# Patient Record
Sex: FEMALE | Race: WHITE | NOT HISPANIC OR LATINO | Employment: FULL TIME | URBAN - METROPOLITAN AREA
[De-identification: names, ages, dates, MRNs, and addresses within clinical notes are randomized per-mention and may not be internally consistent; named-entity substitution may affect disease eponyms.]

---

## 2019-09-03 ENCOUNTER — OFFICE VISIT (OUTPATIENT)
Dept: FAMILY MEDICINE CLINIC | Facility: CLINIC | Age: 24
End: 2019-09-03

## 2019-09-03 VITALS
HEART RATE: 80 BPM | DIASTOLIC BLOOD PRESSURE: 69 MMHG | BODY MASS INDEX: 23.01 KG/M2 | OXYGEN SATURATION: 98 % | TEMPERATURE: 98.3 F | SYSTOLIC BLOOD PRESSURE: 104 MMHG | WEIGHT: 143.2 LBS | HEIGHT: 66 IN

## 2019-09-03 DIAGNOSIS — F41.1 GAD (GENERALIZED ANXIETY DISORDER): Primary | ICD-10-CM

## 2019-09-03 DIAGNOSIS — Z30.41 ENCOUNTER FOR SURVEILLANCE OF CONTRACEPTIVE PILLS: ICD-10-CM

## 2019-09-03 PROCEDURE — 99213 OFFICE O/P EST LOW 20 MIN: CPT | Performed by: FAMILY MEDICINE

## 2019-09-03 RX ORDER — NORETHINDRONE ACETATE AND ETHINYL ESTRADIOL, AND FERROUS FUMARATE 1MG-20(24)
1 KIT ORAL DAILY
COMMUNITY
Start: 2019-08-16 | End: 2021-12-29

## 2019-09-03 RX ORDER — ESCITALOPRAM OXALATE 10 MG/1
10 TABLET ORAL DAILY
Qty: 90 TABLET | Refills: 2 | Status: SHIPPED | OUTPATIENT
Start: 2019-09-03 | End: 2020-08-31

## 2019-09-03 RX ORDER — ESCITALOPRAM OXALATE 10 MG/1
TABLET ORAL
COMMUNITY
Start: 2019-07-26 | End: 2019-09-03 | Stop reason: SDUPTHER

## 2019-09-03 NOTE — PROGRESS NOTES
Chief Complaint   Patient presents with   • Anxiety       Subjective   Sultana Hampton is a 23 y.o. female.     History of Present Illness     F/u BARBARA.   Doing well with lexapro.  No SE.  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    F/U contraception.  Doing well with birth control pill.   2 years in July now.         The following portions of the patient's history were reviewed and updated as appropriate: allergies, current medications, past family history, past medical history, past social history, past surgical history and problem list.    Review of Systems   Constitutional: Negative for appetite change and fatigue.   HENT: Negative for nosebleeds and sore throat.    Eyes: Negative for blurred vision and visual disturbance.   Respiratory: Negative for shortness of breath and wheezing.    Cardiovascular: Negative for chest pain and leg swelling.   Gastrointestinal: Negative for abdominal distention and abdominal pain.   Endocrine: Negative for cold intolerance and polyuria.   Genitourinary: Negative for dysuria and hematuria.   Musculoskeletal: Negative for arthralgias and myalgias.   Skin: Negative for color change and rash.   Neurological: Negative for weakness and confusion.   Psychiatric/Behavioral: Negative for agitation and depressed mood.       Patient Active Problem List   Diagnosis   • BARBARA (generalized anxiety disorder)   • Encounter for surveillance of contraceptive pills       Allergies   Allergen Reactions   • Ibuprofen Swelling   • Amoxicillin Other (See Comments)         Current Outpatient Medications:   •  escitalopram (LEXAPRO) 10 MG tablet, Take 1 tablet by mouth Daily., Disp: 90 tablet, Rfl: 2  •  Norethin Ace-Eth Estrad-FE 1-20 MG-MCG(24) capsule, Take 1 tablet by mouth Daily., Disp: , Rfl:     Past Medical History:   Diagnosis Date   • Anxiety        History reviewed. No pertinent surgical history.    History  reviewed. No pertinent family history.    Social History     Tobacco Use   • Smoking status: Never Smoker   • Smokeless tobacco: Never Used   Substance Use Topics   • Alcohol use: No     Frequency: Never            Objective     Vitals:    09/03/19 1346   BP: 104/69   Pulse: 80   Temp: 98.3 °F (36.8 °C)   SpO2: 98%     Body mass index is 23.11 kg/m².    Physical Exam   Constitutional: She appears well-developed and well-nourished.   HENT:   Head: Normocephalic and atraumatic.   Mouth/Throat: Oropharynx is clear and moist.   Eyes: Pupils are equal, round, and reactive to light. No scleral icterus.   Neck: No thyromegaly present.   Cardiovascular: Normal rate and regular rhythm. Exam reveals no gallop and no friction rub.   No murmur heard.  Pulmonary/Chest: Effort normal. No respiratory distress. She has no wheezes. She has no rales. She exhibits no tenderness.   Abdominal: Soft. Bowel sounds are normal. She exhibits no distension. There is no tenderness.   Musculoskeletal: Normal range of motion. She exhibits no edema or deformity.   Lymphadenopathy:     She has no cervical adenopathy.   Neurological: No cranial nerve deficit. She exhibits normal muscle tone.   Skin: Skin is warm and dry. No rash noted. She is not diaphoretic.   Vitals reviewed.      No results found for: GLUCOSE, BUN, CREATININE, EGFRIFNONA, EGFRIFAFRI, BCR, K, CO2, CALCIUM, PROTENTOTREF, ALBUMIN, LABIL2, AST, ALT    No results found for: WBC, RBC, HGB, HCT, MCV, MCH, MCHC, RDW, RDWSD, MPV, PLT, NEUTRORELPCT, LYMPHORELPCT, MONORELPCT, EOSRELPCT, BASORELPCT, AUTOIGPER, NEUTROABS, LYMPHSABS, MONOSABS, EOSABS, BASOSABS, AUTOIGNUM, NRBC    Lab Results   Component Value Date    HGBA1C 4.9 04/02/2018       No results found for: [id number]    No results found for: TSH    No results found for: CHOL  Lab Results   Component Value Date    TRIG 55 04/02/2018     Lab Results   Component Value Date    HDL 40 (L) 04/02/2018     Lab Results   Component Value Date     LDL 91 04/02/2018     Lab Results   Component Value Date    VLDL 11 04/02/2018     No results found for: LDLHDL      Procedures    Assessment/Plan   Problems Addressed this Visit        Other    BARBARA (generalized anxiety disorder) - Primary    Relevant Medications    escitalopram (LEXAPRO) 10 MG tablet    Encounter for surveillance of contraceptive pills          No orders of the defined types were placed in this encounter.      Current Outpatient Medications   Medication Sig Dispense Refill   • escitalopram (LEXAPRO) 10 MG tablet Take 1 tablet by mouth Daily. 90 tablet 2   • Norethin Ace-Eth Estrad-FE 1-20 MG-MCG(24) capsule Take 1 tablet by mouth Daily.       No current facility-administered medications for this visit.        Sultana Berta had no medications administered during this visit.    Return in about 6 months (around 3/3/2020).    There are no Patient Instructions on file for this visit.

## 2020-08-31 RX ORDER — ESCITALOPRAM OXALATE 10 MG/1
TABLET ORAL
Qty: 90 TABLET | Refills: 2 | Status: SHIPPED | OUTPATIENT
Start: 2020-08-31 | End: 2020-09-09 | Stop reason: SDUPTHER

## 2020-09-09 ENCOUNTER — OFFICE VISIT (OUTPATIENT)
Dept: FAMILY MEDICINE CLINIC | Facility: CLINIC | Age: 25
End: 2020-09-09

## 2020-09-09 VITALS
WEIGHT: 148 LBS | HEART RATE: 70 BPM | RESPIRATION RATE: 20 BRPM | DIASTOLIC BLOOD PRESSURE: 60 MMHG | BODY MASS INDEX: 23.78 KG/M2 | TEMPERATURE: 98.2 F | SYSTOLIC BLOOD PRESSURE: 110 MMHG | OXYGEN SATURATION: 97 % | HEIGHT: 66 IN

## 2020-09-09 DIAGNOSIS — Z00.00 ENCOUNTER FOR ANNUAL PHYSICAL EXAM: Primary | ICD-10-CM

## 2020-09-09 DIAGNOSIS — F41.1 GAD (GENERALIZED ANXIETY DISORDER): ICD-10-CM

## 2020-09-09 PROCEDURE — 90471 IMMUNIZATION ADMIN: CPT | Performed by: FAMILY MEDICINE

## 2020-09-09 PROCEDURE — 99395 PREV VISIT EST AGE 18-39: CPT | Performed by: FAMILY MEDICINE

## 2020-09-09 PROCEDURE — 90686 IIV4 VACC NO PRSV 0.5 ML IM: CPT | Performed by: FAMILY MEDICINE

## 2020-09-09 RX ORDER — ESCITALOPRAM OXALATE 10 MG/1
10 TABLET ORAL DAILY
Qty: 90 TABLET | Refills: 2 | Status: SHIPPED | OUTPATIENT
Start: 2020-09-09 | End: 2021-03-04

## 2020-09-09 NOTE — PROGRESS NOTES
"  Patient here for annual physical exam    Subjective   Sultana SCHULZ is a 24 y.o. female.     History of Present Illness   24 year old WF here for annual exam.    The following portions of the patient's history were reviewed and updated as appropriate: allergies, current medications, past family history, past medical history, past social history, past surgical history and problem list  I am exercising regularly 3-5 times a week.    Last colonoscopy:NA.  Optometry:NOt utd.    Last PSA(if applicable):NA  Last mammo(if applicable):NA    F/U BARBARA.  Doing well with LExapro.  \"I like it a lot\".  NO SI or HI.    LDL 91 2 years ago.  TSH normal.      Immunization History   Administered Date(s) Administered   • Flulaval/Fluarix Quad 11/18/2014       Review of Systems   Constitutional: Negative for appetite change and fatigue.   HENT: Negative for nosebleeds and sore throat.    Eyes: Negative for blurred vision and visual disturbance.   Respiratory: Negative for shortness of breath and wheezing.    Cardiovascular: Negative for chest pain and leg swelling.   Gastrointestinal: Negative for abdominal distention and abdominal pain.   Endocrine: Negative for cold intolerance and polyuria.   Genitourinary: Negative for dysuria and hematuria.   Musculoskeletal: Negative for arthralgias and myalgias.   Skin: Negative for color change and rash.   Neurological: Negative for weakness and confusion.   Psychiatric/Behavioral: Negative for agitation and depressed mood.       Patient Active Problem List   Diagnosis   • BARBARA (generalized anxiety disorder)   • Encounter for surveillance of contraceptive pills   • Encounter for annual physical exam       Allergies   Allergen Reactions   • Ibuprofen Swelling   • Amoxicillin Other (See Comments)         Current Outpatient Medications:   •  escitalopram (LEXAPRO) 10 MG tablet, Take 1 tablet by mouth Daily., Disp: 90 tablet, Rfl: 2  •  Norethin Ace-Eth Estrad-FE 1-20 MG-MCG(24) capsule, Take 1 " tablet by mouth Daily., Disp: , Rfl:     Past Medical History:   Diagnosis Date   • Anxiety        History reviewed. No pertinent surgical history.    Family History   Problem Relation Age of Onset   • No Known Problems Mother    • No Known Problems Father        Social History     Tobacco Use   • Smoking status: Never Smoker   • Smokeless tobacco: Never Used   Substance Use Topics   • Alcohol use: No     Frequency: Never            Objective     Vitals:    09/09/20 1504   BP: 110/60   Pulse: 70   Resp: 20   Temp: 98.2 °F (36.8 °C)   SpO2: 97%     Body mass index is 23.89 kg/m².    Physical Exam   Constitutional: She appears well-developed and well-nourished.   HENT:   Head: Normocephalic and atraumatic.   Mouth/Throat: Oropharynx is clear and moist.   Eyes: Pupils are equal, round, and reactive to light. No scleral icterus.   Neck: No thyromegaly present.   Cardiovascular: Normal rate and regular rhythm. Exam reveals no gallop and no friction rub.   No murmur heard.  Pulmonary/Chest: Effort normal. No respiratory distress. She has no wheezes. She has no rales. She exhibits no tenderness.   Abdominal: Soft. Bowel sounds are normal. She exhibits no distension. There is no tenderness.   Musculoskeletal: Normal range of motion. She exhibits no edema or deformity.   Lymphadenopathy:     She has no cervical adenopathy.   Neurological: No cranial nerve deficit. She exhibits normal muscle tone.   Skin: Skin is warm and dry. No rash noted. She is not diaphoretic.   Vitals reviewed.      No results found for: GLUCOSE, BUN, CREATININE, EGFRIFNONA, EGFRIFAFRI, BCR, K, CO2, CALCIUM, PROTENTOTREF, ALBUMIN, LABIL2, BILIRUBIN, AST, ALT    No results found for: WBC, RBC, HGB, HCT, MCV, MCH, MCHC, RDW, RDWSD, MPV, PLT, NEUTRORELPCT, LYMPHORELPCT, MONORELPCT, EOSRELPCT, BASORELPCT, AUTOIGPER, NEUTROABS, LYMPHSABS, MONOSABS, EOSABS, BASOSABS, AUTOIGNUM, NRBC    Lab Results   Component Value Date    HGBA1C 4.9 04/02/2018       No  results found for: [id number]    No results found for: TSH    No results found for: CHOL  Lab Results   Component Value Date    TRIG 55 04/02/2018     Lab Results   Component Value Date    HDL 40 (L) 04/02/2018     Lab Results   Component Value Date    LDL 91 04/02/2018     Lab Results   Component Value Date    VLDL 11 04/02/2018     No results found for: LDLHDL      Procedures    Assessment/Plan   Problems Addressed this Visit        Other    BARBARA (generalized anxiety disorder)    Relevant Medications    escitalopram (LEXAPRO) 10 MG tablet    Encounter for annual physical exam - Primary        Preventive Care:  Recommend to see optometry.  Continue regular exercise.    Orders Placed This Encounter   Procedures   • Fluarix/Fluzone/Afluria Quad>6 Months       Current Outpatient Medications   Medication Sig Dispense Refill   • escitalopram (LEXAPRO) 10 MG tablet Take 1 tablet by mouth Daily. 90 tablet 2   • Norethin Ace-Eth Estrad-FE 1-20 MG-MCG(24) capsule Take 1 tablet by mouth Daily.       No current facility-administered medications for this visit.        Return in about 6 months (around 3/9/2021).    There are no Patient Instructions on file for this visit.

## 2021-01-04 ENCOUNTER — TELEPHONE (OUTPATIENT)
Dept: FAMILY MEDICINE CLINIC | Facility: CLINIC | Age: 26
End: 2021-01-04

## 2021-01-04 NOTE — TELEPHONE ENCOUNTER
Caller: Sultana SCHULZ    Relationship: Self    Best call back number:[phone]    What medications are you currently taking:   Current Outpatient Medications on File Prior to Visit   Medication Sig Dispense Refill   • escitalopram (LEXAPRO) 10 MG tablet Take 1 tablet by mouth Daily. 90 tablet 2   • Norethin Ace-Eth Estrad-FE 1-20 MG-MCG(24) capsule Take 1 tablet by mouth Daily.       No current facility-administered medications on file prior to visit.             Which medication are you concerned about: LEXAPRO 10 MG     What are your concerns: PATIENT AND  ARE GOING TO START TRYING TO HAVE A BABY. SHE WANTS ADVISE ON WHAT TO DO WITH MEDICATION. CAN SHE BE ON IT? DOES SHE NEED TO CUT BACK?  How long have you been taking these medications: 1 YEAR

## 2021-01-05 NOTE — TELEPHONE ENCOUNTER
oK FOR HUB TO RELAY MESSAGE:      Decrease lexapro to 1/2 tablet a day for 2 weeks then off.  Stop birth control after one month.

## 2021-01-25 ENCOUNTER — TELEPHONE (OUTPATIENT)
Dept: FAMILY MEDICINE CLINIC | Facility: CLINIC | Age: 26
End: 2021-01-25

## 2021-01-25 NOTE — TELEPHONE ENCOUNTER
Patient states she has taken pregnancy test and is negative. Gave patient information regarding Lexapro, patient verbalized understanding.

## 2021-01-25 NOTE — TELEPHONE ENCOUNTER
THE PATIENT HAS GONE OFF HER LEXIPRO. SHE HAS BEEN HAVING DIZZINESS AND HAS BEEN NAUSEOUS. SHE WANTS TO KNOW IF THAT IS A TYPICAL SYMPTOM.    CALLBACK NUMBER: [phone]

## 2021-01-25 NOTE — TELEPHONE ENCOUNTER
Tell her that could be from coming off the lexapro, but that is also symptoms of being pregnant.  Tell her to take a pregnancy test to see the results of this. Thanks.

## 2021-03-04 ENCOUNTER — OFFICE VISIT (OUTPATIENT)
Dept: FAMILY MEDICINE CLINIC | Facility: CLINIC | Age: 26
End: 2021-03-04

## 2021-03-04 VITALS
HEIGHT: 66 IN | HEART RATE: 72 BPM | SYSTOLIC BLOOD PRESSURE: 110 MMHG | OXYGEN SATURATION: 100 % | TEMPERATURE: 97.5 F | WEIGHT: 140 LBS | BODY MASS INDEX: 22.5 KG/M2 | DIASTOLIC BLOOD PRESSURE: 76 MMHG

## 2021-03-04 DIAGNOSIS — Z31.69 ENCOUNTER FOR PRECONCEPTION CONSULTATION: ICD-10-CM

## 2021-03-04 DIAGNOSIS — F41.1 GAD (GENERALIZED ANXIETY DISORDER): Primary | ICD-10-CM

## 2021-03-04 PROCEDURE — 99213 OFFICE O/P EST LOW 20 MIN: CPT | Performed by: FAMILY MEDICINE

## 2021-03-04 NOTE — PROGRESS NOTES
Chief Complaint   Patient presents with   • Anxiety       Subjective   Sultana SCHULZ is a 25 y.o. female.     History of Present Illness   F/U BARBARA.   Took self off lexapro and had some withdrawal.  Still with anxiety but dealing with it.    Thinking about having a baby.  On PNV.    The following portions of the patient's history were reviewed and updated as appropriate: allergies, current medications, past family history, past medical history, past social history, past surgical history and problem list.    Review of Systems   Constitutional: Negative for appetite change and fatigue.   HENT: Negative for nosebleeds and sore throat.    Eyes: Negative for blurred vision and visual disturbance.   Respiratory: Negative for shortness of breath and wheezing.    Cardiovascular: Negative for chest pain and leg swelling.   Gastrointestinal: Negative for abdominal distention and abdominal pain.   Endocrine: Negative for cold intolerance and polyuria.   Genitourinary: Negative for dysuria and hematuria.   Musculoskeletal: Negative for arthralgias and myalgias.   Skin: Negative for color change and rash.   Neurological: Negative for weakness and confusion.   Psychiatric/Behavioral: Negative for agitation and depressed mood.       Patient Active Problem List   Diagnosis   • BARBARA (generalized anxiety disorder)   • Encounter for surveillance of contraceptive pills   • Encounter for annual physical exam   • Encounter for preconception consultation       Allergies   Allergen Reactions   • Ibuprofen Swelling   • Amoxicillin Other (See Comments)         Current Outpatient Medications:   •  Norethin Ace-Eth Estrad-FE 1-20 MG-MCG(24) capsule, Take 1 tablet by mouth Daily., Disp: , Rfl:     Past Medical History:   Diagnosis Date   • Anxiety        No past surgical history on file.    Family History   Problem Relation Age of Onset   • No Known Problems Mother    • No Known Problems Father        Social History     Tobacco Use   • Smoking  status: Never Smoker   • Smokeless tobacco: Never Used   Substance Use Topics   • Alcohol use: No     Frequency: Never            Objective     Vitals:    03/04/21 1436   BP: 110/76   Pulse: 72   Temp: 97.5 °F (36.4 °C)   SpO2: 100%     Body mass index is 22.6 kg/m².    Physical Exam    No results found for: GLUCOSE, BUN, CREATININE, EGFRIFNONA, EGFRIFAFRI, BCR, K, CO2, CALCIUM, PROTENTOTREF, ALBUMIN, LABIL2, BILIRUBIN, AST, ALT    No results found for: WBC, RBC, HGB, HCT, MCV, MCH, MCHC, RDW, RDWSD, MPV, PLT, NEUTRORELPCT, LYMPHORELPCT, MONORELPCT, EOSRELPCT, BASORELPCT, AUTOIGPER, NEUTROABS, LYMPHSABS, MONOSABS, EOSABS, BASOSABS, AUTOIGNUM, NRBC    Lab Results   Component Value Date    HGBA1C 4.9 04/02/2018       No results found for: [id number]    No results found for: TSH    No results found for: CHOL  Lab Results   Component Value Date    TRIG 55 04/02/2018     Lab Results   Component Value Date    HDL 40 (L) 04/02/2018     Lab Results   Component Value Date    LDL 91 04/02/2018     Lab Results   Component Value Date    VLDL 11 04/02/2018     No results found for: LDLHDL      Procedures    Assessment/Plan   Problems Addressed this Visit     Encounter for preconception consultation    BARBARA (generalized anxiety disorder) - Primary      Diagnoses       Codes Comments    BARBARA (generalized anxiety disorder)    -  Primary ICD-10-CM: F41.1  ICD-9-CM: 300.02     Encounter for preconception consultation     ICD-10-CM: Z31.69  ICD-9-CM: V26.49       Behavioral tx for BARBARA encouraged.    Continue PNV.  If severe will try zoloft low dose.       No orders of the defined types were placed in this encounter.      Current Outpatient Medications   Medication Sig Dispense Refill   • Norethin Ace-Eth Estrad-FE 1-20 MG-MCG(24) capsule Take 1 tablet by mouth Daily.       No current facility-administered medications for this visit.        Sultanamona SCHULZ had no medications administered during this visit.    Return in about 8 months  (around 11/4/2021).    There are no Patient Instructions on file for this visit.

## 2021-04-26 ENCOUNTER — TELEPHONE (OUTPATIENT)
Dept: FAMILY MEDICINE CLINIC | Facility: CLINIC | Age: 26
End: 2021-04-26

## 2021-04-26 ENCOUNTER — TELEMEDICINE (OUTPATIENT)
Dept: FAMILY MEDICINE CLINIC | Facility: CLINIC | Age: 26
End: 2021-04-26

## 2021-04-26 DIAGNOSIS — F41.1 GAD (GENERALIZED ANXIETY DISORDER): Primary | ICD-10-CM

## 2021-04-26 DIAGNOSIS — F42.2 MIXED OBSESSIONAL THOUGHTS AND ACTS: ICD-10-CM

## 2021-04-26 PROCEDURE — 99442 PR PHYS/QHP TELEPHONE EVALUATION 11-20 MIN: CPT | Performed by: FAMILY MEDICINE

## 2021-04-26 RX ORDER — ESCITALOPRAM OXALATE 10 MG/1
10 TABLET ORAL DAILY
Qty: 90 TABLET | Refills: 2 | Status: SHIPPED | OUTPATIENT
Start: 2021-04-26 | End: 2021-12-29

## 2021-04-26 NOTE — TELEPHONE ENCOUNTER
PATIENT CALLED STATING THAT SHE IS OFF OF LAXAPRO DUE TO TRYING TO CONCEIVE. PATIENT WAS GIVEN A LOW DOSE OF ZOLOFT BUT STATES THAT IT IS NOT HELPING. SHE WOULD LIKE TO KNOW CLINICAL ADVICE ON HOW TO PROCEED. SHOULD SHE INCREASE THE ZOLOFT OR GO BACK ON LEXAPRO.    PLEASE ADVISE  [phone]

## 2021-04-26 NOTE — PROGRESS NOTES
C/o increased anxiety and obsessive thoughts.    Subjective   Sultana SCHULZ is a 25 y.o. female.     History of Present Illness   Telephone visit due to Covid.  Consent obtained.  12-minute visit.  Patient complains of increased anxiety and obsessive thoughts.  This has been going on for years.  Patient is currently considering/attempting conceiving a pregnancy.  Patient states the Zoloft 50 mg that she is on is not doing well for her anxiety/obsessive thoughts.  She is seeing a counselor currently and still is unable to control her thoughts.  She did do well with Lexapro 10 in the past.  She is desiring to go back on the Lexapro.  The following portions of the patient's history were reviewed and updated as appropriate: allergies, current medications, past family history, past medical history, past social history, past surgical history and problem list.    Review of Systems   Constitutional: Negative for appetite change and fatigue.   HENT: Negative for nosebleeds and sore throat.    Eyes: Negative for blurred vision and visual disturbance.   Respiratory: Negative for shortness of breath and wheezing.    Cardiovascular: Negative for chest pain and leg swelling.   Gastrointestinal: Negative for abdominal distention and abdominal pain.   Endocrine: Negative for cold intolerance and polyuria.   Genitourinary: Negative for dysuria and hematuria.   Musculoskeletal: Negative for arthralgias and myalgias.   Skin: Negative for color change and rash.   Neurological: Negative for weakness and confusion.   Psychiatric/Behavioral: Negative for agitation and depressed mood. The patient is nervous/anxious.        Patient Active Problem List   Diagnosis   • BARBARA (generalized anxiety disorder)   • Encounter for surveillance of contraceptive pills   • Encounter for annual physical exam   • Encounter for preconception consultation   • Mixed obsessional thoughts and acts       Allergies   Allergen Reactions   • Ibuprofen Swelling   •  Amoxicillin Other (See Comments)         Current Outpatient Medications:   •  escitalopram (LEXAPRO) 10 MG tablet, Take 1 tablet by mouth Daily., Disp: 90 tablet, Rfl: 2  •  Norethin Ace-Eth Estrad-FE 1-20 MG-MCG(24) capsule, Take 1 tablet by mouth Daily., Disp: , Rfl:     Past Medical History:   Diagnosis Date   • Anxiety        No past surgical history on file.    Family History   Problem Relation Age of Onset   • No Known Problems Mother    • No Known Problems Father        Social History     Tobacco Use   • Smoking status: Never Smoker   • Smokeless tobacco: Never Used   Substance Use Topics   • Alcohol use: No            Objective     There were no vitals filed for this visit.  There is no height or weight on file to calculate BMI.    Physical Exam  Neurological:      Mental Status: She is oriented to person, place, and time.   Psychiatric:         Judgment: Judgment normal.         No results found for: GLUCOSE, BUN, CREATININE, EGFRIFNONA, EGFRIFAFRI, BCR, K, CO2, CALCIUM, PROTENTOTREF, ALBUMIN, LABIL2, BILIRUBIN, AST, ALT    No results found for: WBC, RBC, HGB, HCT, MCV, MCH, MCHC, RDW, RDWSD, MPV, PLT, NEUTRORELPCT, LYMPHORELPCT, MONORELPCT, EOSRELPCT, BASORELPCT, AUTOIGPER, NEUTROABS, LYMPHSABS, MONOSABS, EOSABS, BASOSABS, AUTOIGNUM, NRBC    Lab Results   Component Value Date    HGBA1C 4.9 04/02/2018       No results found for: [id number]    No results found for: TSH    No results found for: CHOL  Lab Results   Component Value Date    TRIG 55 04/02/2018     Lab Results   Component Value Date    HDL 40 (L) 04/02/2018     Lab Results   Component Value Date    LDL 91 04/02/2018     Lab Results   Component Value Date    VLDL 11 04/02/2018     No results found for: LDLHDL      Procedures    Assessment/Plan   Problems Addressed this Visit     BARBARA (generalized anxiety disorder) - Primary    Relevant Medications    escitalopram (LEXAPRO) 10 MG tablet    Mixed obsessional thoughts and acts    Relevant Medications     escitalopram (LEXAPRO) 10 MG tablet      Diagnoses       Codes Comments    BARBARA (generalized anxiety disorder)    -  Primary ICD-10-CM: F41.1  ICD-9-CM: 300.02     Mixed obsessional thoughts and acts     ICD-10-CM: F42.2  ICD-9-CM: 300.3       BARBARA/mixed obsessional thoughts.  Uncontrolled.  Switch Zoloft 50 to Lexapro 10.  Discussion with patient concerning [employer] [employer] article in 2015 showing that Zoloft and Lexapro were not associated with birth defects in 20,000 babies with known birth defects.  660 had SSRI exposure.  Patient aware of data/risk and desires to proceed with Lexapro treatment.  Continue therapist treatment.  Follow-up 3 months.    No orders of the defined types were placed in this encounter.      Current Outpatient Medications   Medication Sig Dispense Refill   • escitalopram (LEXAPRO) 10 MG tablet Take 1 tablet by mouth Daily. 90 tablet 2   • Norethin Ace-Eth Estrad-FE 1-20 MG-MCG(24) capsule Take 1 tablet by mouth Daily.       No current facility-administered medications for this visit.       Sultana SCHULZ had no medications administered during this visit.    No follow-ups on file.    There are no Patient Instructions on file for this visit.

## 2021-12-29 ENCOUNTER — OFFICE VISIT (OUTPATIENT)
Dept: FAMILY MEDICINE CLINIC | Facility: CLINIC | Age: 26
End: 2021-12-29

## 2021-12-29 VITALS
HEART RATE: 100 BPM | OXYGEN SATURATION: 100 % | BODY MASS INDEX: 24.75 KG/M2 | HEIGHT: 66 IN | WEIGHT: 154 LBS | SYSTOLIC BLOOD PRESSURE: 118 MMHG | DIASTOLIC BLOOD PRESSURE: 66 MMHG | TEMPERATURE: 97.2 F

## 2021-12-29 DIAGNOSIS — B07.8 OTHER VIRAL WARTS: ICD-10-CM

## 2021-12-29 DIAGNOSIS — Z00.00 ENCOUNTER FOR ANNUAL PHYSICAL EXAM: Primary | ICD-10-CM

## 2021-12-29 DIAGNOSIS — Z3A.13 13 WEEKS GESTATION OF PREGNANCY: ICD-10-CM

## 2021-12-29 PROCEDURE — 17003 DESTRUCT PREMALG LES 2-14: CPT | Performed by: FAMILY MEDICINE

## 2021-12-29 PROCEDURE — 99395 PREV VISIT EST AGE 18-39: CPT | Performed by: FAMILY MEDICINE

## 2021-12-29 PROCEDURE — 17000 DESTRUCT PREMALG LESION: CPT | Performed by: FAMILY MEDICINE

## 2021-12-29 NOTE — PROGRESS NOTES
Patient here for annual physical exam    Subjective   Sultana SCHULZ is a 26 y.o. female.     History of Present Illness   26 year old WF here for annual.    F/U BARBARA.  Off lexapro as pregnant now.    Currently pregnant.  13 weeks along.    The following portions of the patient's history were reviewed and updated as appropriate: allergies, current medications, past family history, past medical history, past social history, past surgical history and problem list    Dentist: UTD.  Last colonoscopy:  Optometry:Not utd.   Last PSA(if applicable):NA  Last mammo(if applicable):NA    C/o warts on bilateral hands.  There for a year.  No better.     Immunization History   Administered Date(s) Administered   • Flu Vaccine Quad PF >18YRS 12/14/2021   • FluLaval/Fluarix/Fluzone >6 11/18/2014, 09/09/2020       Review of Systems   Constitutional: Negative for appetite change and fatigue.   HENT: Negative for nosebleeds and sore throat.    Eyes: Negative for blurred vision and visual disturbance.   Respiratory: Negative for shortness of breath and wheezing.    Cardiovascular: Negative for chest pain and leg swelling.   Gastrointestinal: Negative for abdominal distention and abdominal pain.   Endocrine: Negative for cold intolerance and polyuria.   Genitourinary: Negative for dysuria and hematuria.   Musculoskeletal: Negative for arthralgias and myalgias.   Skin: Negative for color change and rash.   Neurological: Negative for weakness and confusion.   Psychiatric/Behavioral: Negative for agitation and depressed mood.       Patient Active Problem List   Diagnosis   • BARBARA (generalized anxiety disorder)   • Encounter for surveillance of contraceptive pills   • Encounter for annual physical exam   • Encounter for preconception consultation   • Mixed obsessional thoughts and acts   • Other viral warts   • 13 weeks gestation of pregnancy       Allergies   Allergen Reactions   • Ibuprofen Swelling   • Amoxicillin Other (See Comments)          Current Outpatient Medications:   •  Prenatal Vit-Fe Fumarate-FA (PRENATAL VITAMIN PO), Take  by mouth., Disp: , Rfl:     Past Medical History:   Diagnosis Date   • Anxiety        No past surgical history on file.    Family History   Problem Relation Age of Onset   • No Known Problems Mother    • No Known Problems Father        Social History     Tobacco Use   • Smoking status: Never Smoker   • Smokeless tobacco: Never Used   Substance Use Topics   • Alcohol use: No            Objective     Vitals:    12/29/21 0805   BP: 118/66   Pulse: 100   Temp: 97.2 °F (36.2 °C)   SpO2: 100%     Body mass index is 24.86 kg/m².    Physical Exam  Vitals reviewed.   Constitutional:       Appearance: She is well-developed. She is not diaphoretic.   HENT:      Head: Normocephalic and atraumatic.   Eyes:      General: No scleral icterus.     Pupils: Pupils are equal, round, and reactive to light.   Neck:      Thyroid: No thyromegaly.   Cardiovascular:      Rate and Rhythm: Normal rate and regular rhythm.      Heart sounds: No murmur heard.  No friction rub. No gallop.    Pulmonary:      Effort: Pulmonary effort is normal. No respiratory distress.      Breath sounds: No wheezing or rales.   Chest:      Chest wall: No tenderness.   Abdominal:      General: Bowel sounds are normal. There is no distension.      Palpations: Abdomen is soft.      Tenderness: There is no abdominal tenderness.   Musculoskeletal:         General: No deformity. Normal range of motion.   Lymphadenopathy:      Cervical: No cervical adenopathy.   Skin:     General: Skin is warm and dry.      Findings: No rash.   Neurological:      Cranial Nerves: No cranial nerve deficit.      Motor: No abnormal muscle tone.         No results found for: GLUCOSE, BUN, CREATININE, EGFRIFNONA, EGFRIFAFRI, BCR, K, CO2, CALCIUM, PROTENTOTREF, ALBUMIN, LABIL2, BILIRUBIN, AST, ALT    WBC   Date Value Ref Range Status   12/14/2021 8.40 4.5 - 11.0 10*3/uL Final     RBC   Date  Value Ref Range Status   12/14/2021 4.02 4.0 - 5.2 10*6/uL Final     Hemoglobin   Date Value Ref Range Status   12/14/2021 12.2 12.0 - 16.0 g/dL Final     Hematocrit   Date Value Ref Range Status   12/14/2021 37.4 36.0 - 46.0 % Final     MCV   Date Value Ref Range Status   12/14/2021 93.0 80.0 - 100.0 fL Final     MCH   Date Value Ref Range Status   12/14/2021 30.3 26.0 - 34.0 pg Final     MCHC   Date Value Ref Range Status   12/14/2021 32.6 31.0 - 37.0 g/dL Final     RDW   Date Value Ref Range Status   12/14/2021 13.2 12.0 - 16.8 % Final     MPV   Date Value Ref Range Status   12/14/2021 10.6 8.4 - 12.4 fL Final     Platelets   Date Value Ref Range Status   12/14/2021 289 140 - 440 10*3/uL Final     Neutrophil Rel %   Date Value Ref Range Status   12/14/2021 70.4 45 - 80 % Final     Lymphocyte Rel %   Date Value Ref Range Status   12/14/2021 20.5 15 - 50 % Final     Monocyte Rel %   Date Value Ref Range Status   12/14/2021 7.3 0 - 15 % Final     Eosinophil %   Date Value Ref Range Status   12/14/2021 1.0 0 - 7 % Final     Basophil Rel %   Date Value Ref Range Status   12/14/2021 0.4 0 - 2 % Final     Immature Grans %   Date Value Ref Range Status   12/14/2021 0.4 0.0 - 1.0 % Final     Neutrophils Absolute   Date Value Ref Range Status   12/14/2021 5.93 2.0 - 8.8 10*3/uL Final     Lymphocytes Absolute   Date Value Ref Range Status   12/14/2021 1.72 0.7 - 5.5 10*3/uL Final     Monocytes Absolute   Date Value Ref Range Status   12/14/2021 0.61 0.0 - 1.7 10*3/uL Final     Eosinophils Absolute   Date Value Ref Range Status   12/14/2021 0.08 0.0 - 0.8 10*3/uL Final     Basophils Absolute   Date Value Ref Range Status   12/14/2021 0.03 0.0 - 0.2 10*3/uL Final     Immature Grans, Absolute   Date Value Ref Range Status   12/14/2021 0.03 0.00 - 0.10 10*3/uL Final     nRBC   Date Value Ref Range Status   12/14/2021 0 0 /100(WBC) Final       Lab Results   Component Value Date    HGBA1C 5.1 12/14/2021       No results found  for: [id number]    No results found for: TSH    No results found for: CHOL  Lab Results   Component Value Date    TRIG 55 04/02/2018     Lab Results   Component Value Date    HDL 40 (L) 04/02/2018     Lab Results   Component Value Date    LDL 91 04/02/2018     Lab Results   Component Value Date    VLDL 11 04/02/2018     No results found for: LDLHDL      Procedures    After verbal informed consent, 3 warts on R hand and 2 on L cryotx performed.  Pt tolerated well.  No comp.    Assessment/Plan   Problems Addressed this Visit     13 weeks gestation of pregnancy    Encounter for annual physical exam - Primary    Other viral warts      Diagnoses       Codes Comments    Encounter for annual physical exam    -  Primary ICD-10-CM: Z00.00  ICD-9-CM: V70.0     Other viral warts     ICD-10-CM: B07.8  ICD-9-CM: 078.19     13 weeks gestation of pregnancy     ICD-10-CM: Z3A.13  ICD-9-CM: V22.2       Cryo to 5 warts.  LWC described.    Preventive counseling:  Recommended covid vaccination.  Flu received.  Continue PNV.  Get Tdap at ob office.     No orders of the defined types were placed in this encounter.      Current Outpatient Medications   Medication Sig Dispense Refill   • Prenatal Vit-Fe Fumarate-FA (PRENATAL VITAMIN PO) Take  by mouth.       No current facility-administered medications for this visit.       No follow-ups on file.    There are no Patient Instructions on file for this visit.

## 2022-09-07 ENCOUNTER — OFFICE VISIT (OUTPATIENT)
Dept: FAMILY MEDICINE CLINIC | Facility: CLINIC | Age: 27
End: 2022-09-07

## 2022-09-07 VITALS
SYSTOLIC BLOOD PRESSURE: 110 MMHG | HEART RATE: 83 BPM | OXYGEN SATURATION: 99 % | DIASTOLIC BLOOD PRESSURE: 80 MMHG | WEIGHT: 150.6 LBS | TEMPERATURE: 98.4 F | BODY MASS INDEX: 24.2 KG/M2 | HEIGHT: 66 IN

## 2022-09-07 DIAGNOSIS — J30.1 SEASONAL ALLERGIC RHINITIS DUE TO POLLEN: ICD-10-CM

## 2022-09-07 DIAGNOSIS — F41.1 GAD (GENERALIZED ANXIETY DISORDER): Primary | ICD-10-CM

## 2022-09-07 PROCEDURE — 99213 OFFICE O/P EST LOW 20 MIN: CPT | Performed by: FAMILY MEDICINE

## 2022-09-07 RX ORDER — FLUTICASONE PROPIONATE 50 MCG
2 SPRAY, SUSPENSION (ML) NASAL DAILY
Qty: 16 G | Refills: 11 | Status: SHIPPED | OUTPATIENT
Start: 2022-09-07

## 2022-09-07 NOTE — PROGRESS NOTES
Chief Complaint   Patient presents with   • Anxiety     And also loosing voice for the past 2 months        Subjective   Sultana SCHULZ is a 26 y.o. female.     History of Present Illness   fU BARBARA.  Doing well with therapy only.  Breastfeeding now.    C/o hoarseness.  Going on 3 months.  Off and on.   Feels like I am draining often.    The following portions of the patient's history were reviewed and updated as appropriate: allergies, current medications, past family history, past medical history, past social history, past surgical history and problem list.    Review of Systems   Constitutional: Negative for fatigue and fever.   HENT: Positive for postnasal drip and voice change.    Respiratory: Negative for cough and shortness of breath.        Patient Active Problem List   Diagnosis   • BARBARA (generalized anxiety disorder)   • Encounter for surveillance of contraceptive pills   • Encounter for annual physical exam   • Encounter for preconception consultation   • Mixed obsessional thoughts and acts   • Other viral warts   • 13 weeks gestation of pregnancy   • Seasonal allergic rhinitis due to pollen       Allergies   Allergen Reactions   • Ibuprofen Swelling   • Amoxicillin Other (See Comments)         Current Outpatient Medications:   •  Prenatal Vit-Fe Fumarate-FA (PRENATAL VITAMIN PO), Take  by mouth., Disp: , Rfl:     Past Medical History:   Diagnosis Date   • Anxiety        No past surgical history on file.    Family History   Problem Relation Age of Onset   • No Known Problems Mother    • No Known Problems Father        Social History     Tobacco Use   • Smoking status: Never Smoker   • Smokeless tobacco: Never Used   Substance Use Topics   • Alcohol use: No            Objective     Vitals:    09/07/22 1409   BP: 110/80   Pulse: 83   Temp: 98.4 °F (36.9 °C)   SpO2: 99%     Body mass index is 24.32 kg/m².    Physical Exam  Vitals reviewed.   Constitutional:       Appearance: She is well-developed. She is not  diaphoretic.   HENT:      Head: Normocephalic and atraumatic.   Eyes:      General: No scleral icterus.     Pupils: Pupils are equal, round, and reactive to light.   Neck:      Thyroid: No thyromegaly.   Cardiovascular:      Rate and Rhythm: Normal rate and regular rhythm.      Heart sounds: No murmur heard.    No friction rub. No gallop.   Pulmonary:      Effort: Pulmonary effort is normal. No respiratory distress.      Breath sounds: No wheezing or rales.   Chest:      Chest wall: No tenderness.   Abdominal:      General: Bowel sounds are normal. There is no distension.      Palpations: Abdomen is soft.      Tenderness: There is no abdominal tenderness.   Musculoskeletal:         General: No deformity. Normal range of motion.   Lymphadenopathy:      Cervical: No cervical adenopathy.   Skin:     General: Skin is warm and dry.      Findings: No rash.   Neurological:      Cranial Nerves: No cranial nerve deficit.      Motor: No abnormal muscle tone.         No results found for: GLUCOSE, BUN, CREATININE, EGFRIFNONA, EGFRIFAFRI, BCR, K, CO2, CALCIUM, PROTENTOTREF, ALBUMIN, LABIL2, BILIRUBIN, AST, ALT    WBC   Date Value Ref Range Status   04/20/2022 7.79 4.5 - 11.0 10*3/uL Final     RBC   Date Value Ref Range Status   04/20/2022 3.26 (L) 4.0 - 5.2 10*6/uL Final     Hemoglobin   Date Value Ref Range Status   04/20/2022 9.9 (L) 12.0 - 16.0 g/dL Final     Hematocrit   Date Value Ref Range Status   04/20/2022 30.4 (L) 36.0 - 46.0 % Final     MCV   Date Value Ref Range Status   04/20/2022 93.3 80.0 - 100.0 fL Final     MCH   Date Value Ref Range Status   04/20/2022 30.4 26.0 - 34.0 pg Final     MCHC   Date Value Ref Range Status   04/20/2022 32.6 31.0 - 37.0 g/dL Final     RDW   Date Value Ref Range Status   04/20/2022 13.8 12.0 - 16.8 % Final     MPV   Date Value Ref Range Status   04/20/2022 10.1 8.4 - 12.4 fL Final     Platelets   Date Value Ref Range Status   04/20/2022 319 140 - 440 10*3/uL Final     Neutrophil Rel %    Date Value Ref Range Status   04/20/2022 69.5 45 - 80 % Final     Lymphocyte Rel %   Date Value Ref Range Status   04/20/2022 22.2 15 - 50 % Final     Monocyte Rel %   Date Value Ref Range Status   04/20/2022 6.8 0 - 15 % Final     Eosinophil %   Date Value Ref Range Status   04/20/2022 0.4 0 - 7 % Final     Basophil Rel %   Date Value Ref Range Status   04/20/2022 0.3 0 - 2 % Final     Immature Grans %   Date Value Ref Range Status   04/20/2022 0.8 0.0 - 1.0 % Final     Neutrophils Absolute   Date Value Ref Range Status   04/20/2022 5.42 2.0 - 8.8 10*3/uL Final     Lymphocytes Absolute   Date Value Ref Range Status   04/20/2022 1.73 0.7 - 5.5 10*3/uL Final     Monocytes Absolute   Date Value Ref Range Status   04/20/2022 0.53 0.0 - 1.7 10*3/uL Final     Eosinophils Absolute   Date Value Ref Range Status   04/20/2022 0.03 0.0 - 0.8 10*3/uL Final     Basophils Absolute   Date Value Ref Range Status   04/20/2022 0.02 0.0 - 0.2 10*3/uL Final     Immature Grans, Absolute   Date Value Ref Range Status   04/20/2022 0.06 0.00 - 0.10 10*3/uL Final     nRBC   Date Value Ref Range Status   04/20/2022 0 0 /100(WBC) Final       Lab Results   Component Value Date    HGBA1C 5.1 12/14/2021       No results found for: [id number]    No results found for: TSH    No results found for: CHOL  Lab Results   Component Value Date    TRIG 55 04/02/2018     Lab Results   Component Value Date    HDL 40 (L) 04/02/2018     Lab Results   Component Value Date    LDL 91 04/02/2018     Lab Results   Component Value Date    VLDL 11 04/02/2018     No results found for: LDLHDL      Procedures    Assessment & Plan   Problems Addressed this Visit     BARBARA (generalized anxiety disorder) - Primary    Seasonal allergic rhinitis due to pollen      Diagnoses       Codes Comments    BARBARA (generalized anxiety disorder)    -  Primary ICD-10-CM: F41.1  ICD-9-CM: 300.02     Seasonal allergic rhinitis due to pollen     ICD-10-CM: J30.1  ICD-9-CM: 477.0        Seasonal AR, chronic uncontrolled.  Start fluticasone ns two sprays daily in each nostril.    BARBARA. Chronic, controlled.  Continue behavioral tx.      No orders of the defined types were placed in this encounter.      Current Outpatient Medications   Medication Sig Dispense Refill   • Prenatal Vit-Fe Fumarate-FA (PRENATAL VITAMIN PO) Take  by mouth.       No current facility-administered medications for this visit.       Sultana SCHULZ had no medications administered during this visit.    No follow-ups on file.    There are no Patient Instructions on file for this visit.

## 2023-03-08 ENCOUNTER — OFFICE VISIT (OUTPATIENT)
Dept: FAMILY MEDICINE CLINIC | Facility: CLINIC | Age: 28
End: 2023-03-08
Payer: COMMERCIAL

## 2023-03-08 VITALS
BODY MASS INDEX: 22.76 KG/M2 | TEMPERATURE: 97.8 F | OXYGEN SATURATION: 100 % | WEIGHT: 141.6 LBS | HEIGHT: 66 IN | HEART RATE: 79 BPM | SYSTOLIC BLOOD PRESSURE: 112 MMHG | DIASTOLIC BLOOD PRESSURE: 82 MMHG

## 2023-03-08 DIAGNOSIS — D64.89 ANEMIA DUE TO OTHER CAUSE, NOT CLASSIFIED: ICD-10-CM

## 2023-03-08 DIAGNOSIS — Z00.00 ENCOUNTER FOR ANNUAL PHYSICAL EXAM: Primary | ICD-10-CM

## 2023-03-08 PROCEDURE — 99395 PREV VISIT EST AGE 18-39: CPT | Performed by: FAMILY MEDICINE

## 2023-03-08 RX ORDER — FERROUS SULFATE 137(45) MG
TABLET, EXTENDED RELEASE ORAL EVERY 24 HOURS
COMMUNITY

## 2023-03-08 NOTE — PROGRESS NOTES
Patient here for annual physical exam    Subjective   Sultana SCHULZ is a 27 y.o. female.     History of Present Illness   27 year old WF here for annual.    The following portions of the patient's history were reviewed and updated as appropriate: allergies, current medications, past family history, past medical history, past social history, past surgical history and problem list    Last colonoscopy:na  Dentist: [facility]EN[facility].  Optometry:needed.  Last PSA(if applicable):na  Last mammo(if applicable):na    Immunization History   Administered Date(s) Administered   • FluLaval/Fluzone >6mos 11/18/2014, 09/09/2020, 11/30/2021   • Fluzone Quad >6mos (Multi-dose) 12/14/2021   • Influenza Split Preservative Free ID 12/14/2021   • Influenza, Unspecified 11/18/2014, 09/09/2020, 11/30/2021   • Tdap 04/20/2022       Review of Systems   Constitutional: Negative for appetite change and fatigue.   HENT: Negative for nosebleeds and sore throat.    Eyes: Negative for blurred vision and visual disturbance.   Respiratory: Negative for shortness of breath and wheezing.    Cardiovascular: Negative for chest pain and leg swelling.   Gastrointestinal: Negative for abdominal distention and abdominal pain.   Endocrine: Negative for cold intolerance and polyuria.   Genitourinary: Negative for dysuria and hematuria.   Musculoskeletal: Negative for arthralgias and myalgias.   Skin: Negative for color change and rash.   Neurological: Negative for weakness and confusion.   Psychiatric/Behavioral: Negative for agitation and depressed mood.       Patient Active Problem List   Diagnosis   • BARBARA (generalized anxiety disorder)   • Encounter for surveillance of contraceptive pills   • Encounter for annual physical exam   • Encounter for preconception consultation   • Mixed obsessional thoughts and acts   • Other viral warts   • 13 weeks gestation of pregnancy   • Seasonal allergic rhinitis due to pollen   • Other specified anemias       Allergies   Allergen  Reactions   • Ibuprofen Swelling   • Amoxicillin Other (See Comments)         Current Outpatient Medications:   •  fluticasone (Flonase) 50 MCG/ACT nasal spray, 2 sprays into the nostril(s) as directed by provider Daily., Disp: 16 g, Rfl: 11  •  Prenatal Vit-Fe Fumarate-FA (PRENATAL VITAMIN PO), Take  by mouth., Disp: , Rfl:   •  Ferrous Sulfate ER (Slow Fe) 142 (45 Fe) MG tablet controlled-release, Daily., Disp: , Rfl:     Past Medical History:   Diagnosis Date   • Anxiety        No past surgical history on file.    Family History   Problem Relation Age of Onset   • No Known Problems Mother    • No Known Problems Father        Social History     Tobacco Use   • Smoking status: Never   • Smokeless tobacco: Never   Substance Use Topics   • Alcohol use: No            Objective     Vitals:    03/08/23 1501   BP: 112/82   Pulse: 79   Temp: 97.8 °F (36.6 °C)   SpO2: 100%     Body mass index is 22.87 kg/m².    Physical Exam  Vitals reviewed.   Constitutional:       Appearance: She is well-developed. She is not diaphoretic.   HENT:      Head: Normocephalic and atraumatic.   Eyes:      General: No scleral icterus.     Pupils: Pupils are equal, round, and reactive to light.   Neck:      Thyroid: No thyromegaly.   Cardiovascular:      Rate and Rhythm: Normal rate and regular rhythm.      Heart sounds: No murmur heard.    No friction rub. No gallop.   Pulmonary:      Effort: Pulmonary effort is normal. No respiratory distress.      Breath sounds: No wheezing or rales.   Chest:      Chest wall: No tenderness.   Abdominal:      General: Bowel sounds are normal. There is no distension.      Palpations: Abdomen is soft.      Tenderness: There is no abdominal tenderness.   Musculoskeletal:         General: No deformity. Normal range of motion.   Lymphadenopathy:      Cervical: No cervical adenopathy.   Skin:     General: Skin is warm and dry.      Findings: No rash.   Neurological:      Cranial Nerves: No cranial nerve deficit.       Motor: No abnormal muscle tone.         No results found for: GLUCOSE, BUN, CREATININE, EGFRIFNONA, EGFRIFAFRI, BCR, K, CO2, CALCIUM, PROTENTOTREF, ALBUMIN, LABIL2, BILIRUBIN, AST, ALT    WBC   Date Value Ref Range Status   04/20/2022 7.79 4.5 - 11.0 10*3/uL Final     RBC   Date Value Ref Range Status   04/20/2022 3.26 (L) 4.0 - 5.2 10*6/uL Final     Hemoglobin   Date Value Ref Range Status   04/20/2022 9.9 (L) 12.0 - 16.0 g/dL Final     Hematocrit   Date Value Ref Range Status   04/20/2022 30.4 (L) 36.0 - 46.0 % Final     MCV   Date Value Ref Range Status   04/20/2022 93.3 80.0 - 100.0 fL Final     MCH   Date Value Ref Range Status   04/20/2022 30.4 26.0 - 34.0 pg Final     MCHC   Date Value Ref Range Status   04/20/2022 32.6 31.0 - 37.0 g/dL Final     RDW   Date Value Ref Range Status   04/20/2022 13.8 12.0 - 16.8 % Final     MPV   Date Value Ref Range Status   04/20/2022 10.1 8.4 - 12.4 fL Final     Platelets   Date Value Ref Range Status   04/20/2022 319 140 - 440 10*3/uL Final     Neutrophil Rel %   Date Value Ref Range Status   04/20/2022 69.5 45 - 80 % Final     Lymphocyte Rel %   Date Value Ref Range Status   04/20/2022 22.2 15 - 50 % Final     Monocyte Rel %   Date Value Ref Range Status   04/20/2022 6.8 0 - 15 % Final     Eosinophil %   Date Value Ref Range Status   04/20/2022 0.4 0 - 7 % Final     Basophil Rel %   Date Value Ref Range Status   04/20/2022 0.3 0 - 2 % Final     Immature Grans %   Date Value Ref Range Status   04/20/2022 0.8 0.0 - 1.0 % Final     Neutrophils Absolute   Date Value Ref Range Status   04/20/2022 5.42 2.0 - 8.8 10*3/uL Final     Lymphocytes Absolute   Date Value Ref Range Status   04/20/2022 1.73 0.7 - 5.5 10*3/uL Final     Monocytes Absolute   Date Value Ref Range Status   04/20/2022 0.53 0.0 - 1.7 10*3/uL Final     Eosinophils Absolute   Date Value Ref Range Status   04/20/2022 0.03 0.0 - 0.8 10*3/uL Final     Basophils Absolute   Date Value Ref Range Status   04/20/2022 0.02  0.0 - 0.2 10*3/uL Final     Immature Grans, Absolute   Date Value Ref Range Status   04/20/2022 0.06 0.00 - 0.10 10*3/uL Final     nRBC   Date Value Ref Range Status   04/20/2022 0 0 /100(WBC) Final       Lab Results   Component Value Date    HGBA1C 5.1 12/14/2021       No results found for: [id number]    No results found for: TSH    No results found for: CHOL  Lab Results   Component Value Date    TRIG 55 04/02/2018     Lab Results   Component Value Date    HDL 40 (L) 04/02/2018     Lab Results   Component Value Date    LDL 91 04/02/2018     Lab Results   Component Value Date    VLDL 11 04/02/2018     No results found for: LDLHDL      Procedures    Assessment & Plan   Problems Addressed this Visit     Encounter for annual physical exam - Primary    Relevant Orders    Lipid Panel With / Chol / HDL Ratio    Other specified anemias    Relevant Medications    Ferrous Sulfate ER (Slow Fe) 142 (45 Fe) MG tablet controlled-release    Other Relevant Orders    Comprehensive Metabolic Panel    CBC & Differential    Ferritin    Vitamin B12    Folate    Iron    TSH Rfx On Abnormal To Free T4   Diagnoses       Codes Comments    Encounter for annual physical exam    -  Primary ICD-10-CM: Z00.00  ICD-9-CM: V70.0     Anemia due to other cause, not classified     ICD-10-CM: D64.89  ICD-9-CM: 285.8           Preventive Counseling:  Encouraged to stay active.  Covid vaccine declined.  Flu declined.   Dentist UTD.  Optho recommended.       Orders Placed This Encounter   Procedures   • Lipid Panel With / Chol / HDL Ratio     Order Specific Question:   Release to patient     Answer:   Routine Release   • Comprehensive Metabolic Panel     Order Specific Question:   Release to patient     Answer:   Routine Release   • Ferritin   • Vitamin B12     Order Specific Question:   Release to patient     Answer:   Routine Release   • Folate   • Iron   • TSH Rfx On Abnormal To Free T4     Order Specific Question:   Release to patient     Answer:    Routine Release   • CBC & Differential     Order Specific Question:   Manual Differential     Answer:   No       Current Outpatient Medications   Medication Sig Dispense Refill   • fluticasone (Flonase) 50 MCG/ACT nasal spray 2 sprays into the nostril(s) as directed by provider Daily. 16 g 11   • Prenatal Vit-Fe Fumarate-FA (PRENATAL VITAMIN PO) Take  by mouth.     • Ferrous Sulfate ER (Slow Fe) 142 (45 Fe) MG tablet controlled-release Daily.       No current facility-administered medications for this visit.       No follow-ups on file.    There are no Patient Instructions on file for this visit.

## 2023-03-09 LAB
ALBUMIN SERPL-MCNC: 4.5 G/DL (ref 3.5–5.2)
ALBUMIN/GLOB SERPL: 1.6 G/DL
ALP SERPL-CCNC: 102 U/L (ref 39–117)
ALT SERPL-CCNC: 11 U/L (ref 1–33)
AST SERPL-CCNC: 16 U/L (ref 1–32)
BASOPHILS # BLD AUTO: 0.04 10*3/MM3 (ref 0–0.2)
BASOPHILS NFR BLD AUTO: 0.6 % (ref 0–1.5)
BILIRUB SERPL-MCNC: 0.2 MG/DL (ref 0–1.2)
BUN SERPL-MCNC: 19 MG/DL (ref 6–20)
BUN/CREAT SERPL: 30.2 (ref 7–25)
CALCIUM SERPL-MCNC: 9.5 MG/DL (ref 8.6–10.5)
CHLORIDE SERPL-SCNC: 103 MMOL/L (ref 98–107)
CHOLEST SERPL-MCNC: 165 MG/DL (ref 0–200)
CHOLEST/HDLC SERPL: 3.17 {RATIO}
CO2 SERPL-SCNC: 26.7 MMOL/L (ref 22–29)
CREAT SERPL-MCNC: 0.63 MG/DL (ref 0.57–1)
EGFRCR SERPLBLD CKD-EPI 2021: 125.7 ML/MIN/1.73
EOSINOPHIL # BLD AUTO: 0.09 10*3/MM3 (ref 0–0.4)
EOSINOPHIL NFR BLD AUTO: 1.2 % (ref 0.3–6.2)
ERYTHROCYTE [DISTWIDTH] IN BLOOD BY AUTOMATED COUNT: 12.6 % (ref 12.3–15.4)
FERRITIN SERPL-MCNC: 59.6 NG/ML (ref 13–150)
FOLATE SERPL-MCNC: >20 NG/ML (ref 4.78–24.2)
GLOBULIN SER CALC-MCNC: 2.9 GM/DL
GLUCOSE SERPL-MCNC: 69 MG/DL (ref 65–99)
HCT VFR BLD AUTO: 39.1 % (ref 34–46.6)
HDLC SERPL-MCNC: 52 MG/DL (ref 40–60)
HGB BLD-MCNC: 13.4 G/DL (ref 12–15.9)
IMM GRANULOCYTES # BLD AUTO: 0.01 10*3/MM3 (ref 0–0.05)
IMM GRANULOCYTES NFR BLD AUTO: 0.1 % (ref 0–0.5)
IRON SERPL-MCNC: 60 MCG/DL (ref 37–145)
LDLC SERPL CALC-MCNC: 95 MG/DL (ref 0–100)
LYMPHOCYTES # BLD AUTO: 2.88 10*3/MM3 (ref 0.7–3.1)
LYMPHOCYTES NFR BLD AUTO: 39.7 % (ref 19.6–45.3)
MCH RBC QN AUTO: 30.5 PG (ref 26.6–33)
MCHC RBC AUTO-ENTMCNC: 34.3 G/DL (ref 31.5–35.7)
MCV RBC AUTO: 88.9 FL (ref 79–97)
MONOCYTES # BLD AUTO: 0.52 10*3/MM3 (ref 0.1–0.9)
MONOCYTES NFR BLD AUTO: 7.2 % (ref 5–12)
NEUTROPHILS # BLD AUTO: 3.72 10*3/MM3 (ref 1.7–7)
NEUTROPHILS NFR BLD AUTO: 51.2 % (ref 42.7–76)
NRBC BLD AUTO-RTO: 0 /100 WBC (ref 0–0.2)
PLATELET # BLD AUTO: 241 10*3/MM3 (ref 140–450)
POTASSIUM SERPL-SCNC: 4 MMOL/L (ref 3.5–5.2)
PROT SERPL-MCNC: 7.4 G/DL (ref 6–8.5)
RBC # BLD AUTO: 4.4 10*6/MM3 (ref 3.77–5.28)
SODIUM SERPL-SCNC: 140 MMOL/L (ref 136–145)
TRIGL SERPL-MCNC: 96 MG/DL (ref 0–150)
TSH SERPL DL<=0.005 MIU/L-ACNC: 1.12 UIU/ML (ref 0.27–4.2)
VIT B12 SERPL-MCNC: 382 PG/ML (ref 211–946)
VLDLC SERPL CALC-MCNC: 18 MG/DL (ref 5–40)
WBC # BLD AUTO: 7.26 10*3/MM3 (ref 3.4–10.8)

## 2024-06-24 ENCOUNTER — OFFICE VISIT (OUTPATIENT)
Dept: FAMILY MEDICINE CLINIC | Facility: CLINIC | Age: 29
End: 2024-06-24
Payer: COMMERCIAL

## 2024-06-24 VITALS
OXYGEN SATURATION: 99 % | HEIGHT: 66 IN | SYSTOLIC BLOOD PRESSURE: 118 MMHG | RESPIRATION RATE: 18 BRPM | TEMPERATURE: 97.7 F | WEIGHT: 159.2 LBS | DIASTOLIC BLOOD PRESSURE: 72 MMHG | BODY MASS INDEX: 25.58 KG/M2 | HEART RATE: 78 BPM

## 2024-06-24 DIAGNOSIS — D64.89 ANEMIA DUE TO OTHER CAUSE, NOT CLASSIFIED: ICD-10-CM

## 2024-06-24 DIAGNOSIS — Z00.00 ENCOUNTER FOR ANNUAL PHYSICAL EXAM: Primary | ICD-10-CM

## 2024-06-24 PROCEDURE — 99214 OFFICE O/P EST MOD 30 MIN: CPT | Performed by: FAMILY MEDICINE

## 2024-06-24 PROCEDURE — 99395 PREV VISIT EST AGE 18-39: CPT | Performed by: FAMILY MEDICINE

## 2024-06-24 NOTE — PROGRESS NOTES
Patient here for annual physical exam    Subjective   Sultana SCHULZ is a 28 y.o. female.     History of Present Illness   29 yo WF here for annual.    The following portions of the patient's history were reviewed and updated as appropriate: allergies, current medications, past family history, past medical history, past social history, past surgical history and problem list    Last colonoscopy:na  Optometry:needed.  Dentist needed.   Last PSA(if applicable):na  Last mammo(if applicable):na    F/U anemia.  Not on iron.  Hg 9.8 from 1 month ago. On PNV with iron now.      Immunization History   Administered Date(s) Administered    Fluzone (or Fluarix & Flulaval for VFC) >6mos 11/18/2014, 09/09/2020, 11/30/2021, 10/30/2023    Fluzone Quad >6mos (Multi-dose) 12/14/2021    Influenza Split Preservative Free ID 12/14/2021    Influenza, Unspecified 11/18/2014, 09/09/2020, 11/30/2021    Tdap 04/20/2022, 02/12/2024       Review of Systems   Constitutional:  Negative for appetite change and fatigue.   HENT:  Negative for nosebleeds and sore throat.    Eyes:  Negative for blurred vision and visual disturbance.   Respiratory:  Negative for shortness of breath and wheezing.    Cardiovascular:  Negative for chest pain and leg swelling.   Gastrointestinal:  Negative for abdominal distention and abdominal pain.   Endocrine: Negative for cold intolerance and polyuria.   Genitourinary:  Negative for dysuria and hematuria.   Musculoskeletal:  Negative for arthralgias and myalgias.   Skin:  Negative for color change and rash.   Neurological:  Negative for weakness and confusion.   Psychiatric/Behavioral:  Negative for agitation and depressed mood.        Patient Active Problem List   Diagnosis    BARBARA (generalized anxiety disorder)    Encounter for surveillance of contraceptive pills    Encounter for annual physical exam    Encounter for preconception consultation    Mixed obsessional thoughts and acts    Other viral warts    13 weeks  gestation of pregnancy    Seasonal allergic rhinitis due to pollen    Other specified anemias       Allergies   Allergen Reactions    Ibuprofen Swelling    Amoxicillin Other (See Comments)         Current Outpatient Medications:     Prenatal Vit-Fe Fumarate-FA (PRENATAL VITAMIN PO), Take  by mouth., Disp: , Rfl:     Past Medical History:   Diagnosis Date    Anxiety        History reviewed. No pertinent surgical history.    Family History   Problem Relation Age of Onset    No Known Problems Mother     No Known Problems Father        Social History     Tobacco Use    Smoking status: Never    Smokeless tobacco: Never   Substance Use Topics    Alcohol use: No            Objective     Vitals:    06/24/24 1424   BP: 118/72   Pulse: 78   Resp: 18   Temp: 97.7 °F (36.5 °C)   SpO2: 99%     Body mass index is 25.71 kg/m².    Physical Exam  Vitals reviewed.   Constitutional:       Appearance: She is well-developed. She is not diaphoretic.   HENT:      Head: Normocephalic and atraumatic.   Eyes:      General: No scleral icterus.     Pupils: Pupils are equal, round, and reactive to light.   Neck:      Thyroid: No thyromegaly.   Cardiovascular:      Rate and Rhythm: Normal rate and regular rhythm.      Heart sounds: No murmur heard.     No friction rub. No gallop.   Pulmonary:      Effort: Pulmonary effort is normal. No respiratory distress.      Breath sounds: No wheezing or rales.   Chest:      Chest wall: No tenderness.   Abdominal:      General: Bowel sounds are normal. There is no distension.      Palpations: Abdomen is soft.      Tenderness: There is no abdominal tenderness.   Musculoskeletal:         General: No deformity. Normal range of motion.   Lymphadenopathy:      Cervical: No cervical adenopathy.   Skin:     General: Skin is warm and dry.      Findings: No rash.   Neurological:      Cranial Nerves: No cranial nerve deficit.      Motor: No abnormal muscle tone.         Lab Results   Component Value Date    GLUCOSE 69  03/08/2023    BUN 19 03/08/2023    CREATININE 0.63 03/08/2023    BCR 30.2 (H) 03/08/2023    K 4.0 03/08/2023    CO2 26.7 03/08/2023    CALCIUM 9.5 03/08/2023    PROTENTOTREF 7.4 03/08/2023    ALBUMIN 4.5 03/08/2023    LABIL2 1.6 03/08/2023    AST 16 03/08/2023    ALT 11 03/08/2023       WBC   Date Value Ref Range Status   04/29/2024 11.87 (H) 4.5 - 11.0 10*3/uL Final     RBC   Date Value Ref Range Status   04/29/2024 3.42 (L) 4.0 - 5.2 10*6/uL Final     Hemoglobin   Date Value Ref Range Status   04/29/2024 9.8 (L) 12.0 - 16.0 g/dL Final     Hematocrit   Date Value Ref Range Status   04/29/2024 30.1 (L) 36.0 - 46.0 % Final     MCV   Date Value Ref Range Status   04/29/2024 88.0 80.0 - 100.0 fL Final     MCH   Date Value Ref Range Status   04/29/2024 28.7 26.0 - 34.0 pg Final     MCHC   Date Value Ref Range Status   04/29/2024 32.6 31.0 - 37.0 g/dL Final     RDW   Date Value Ref Range Status   04/29/2024 15.4 12.0 - 16.8 % Final     MPV   Date Value Ref Range Status   04/29/2024 10.1 8.4 - 12.4 fL Final     Platelets   Date Value Ref Range Status   04/29/2024 199 140 - 440 10*3/uL Final     Neutrophil Rel %   Date Value Ref Range Status   04/29/2024 60.0 45 - 80 % Final     Lymphocyte Rel %   Date Value Ref Range Status   04/29/2024 28.4 15 - 50 % Final     Monocyte Rel %   Date Value Ref Range Status   04/29/2024 10.0 0 - 15 % Final     Eosinophil %   Date Value Ref Range Status   04/29/2024 0.6 0 - 7 % Final     Basophil Rel %   Date Value Ref Range Status   04/29/2024 0.3 0 - 2 % Final     Immature Grans %   Date Value Ref Range Status   04/29/2024 0.7 0.0 - 1.0 % Final     Neutrophils Absolute   Date Value Ref Range Status   04/29/2024 7.12 2.0 - 8.8 10*3/uL Final     Lymphocytes Absolute   Date Value Ref Range Status   04/29/2024 3.37 0.7 - 5.5 10*3/uL Final     Monocytes Absolute   Date Value Ref Range Status   04/29/2024 1.19 0.0 - 1.7 10*3/uL Final     Eosinophils Absolute   Date Value Ref Range Status  "  04/29/2024 0.07 0.0 - 0.8 10*3/uL Final     Basophils Absolute   Date Value Ref Range Status   04/29/2024 0.04 0.0 - 0.2 10*3/uL Final     Immature Grans, Absolute   Date Value Ref Range Status   04/29/2024 0.08 0.00 - 0.10 10*3/uL Final     nRBC   Date Value Ref Range Status   04/29/2024 0 0 /100(WBC) Final       Lab Results   Component Value Date    HGBA1C 4.9 10/02/2023       Lab Results   Component Value Date    YDLAZFUU48 382 03/08/2023       TSH   Date Value Ref Range Status   03/08/2023 1.120 0.270 - 4.200 uIU/mL Final       No results found for: \"CHOL\"  Lab Results   Component Value Date    TRIG 96 03/08/2023     Lab Results   Component Value Date    HDL 52 03/08/2023     Lab Results   Component Value Date    LDL 95 03/08/2023     Lab Results   Component Value Date    VLDL 18 03/08/2023     No results found for: \"LDLHDL\"      Procedures    Assessment & Plan   Problems Addressed this Visit       Encounter for annual physical exam - Primary    Other specified anemias    Relevant Orders    CBC & Differential    Ferritin    Iron    Lipid Panel With / Chol / HDL Ratio     Diagnoses         Codes Comments    Encounter for annual physical exam    -  Primary ICD-10-CM: Z00.00  ICD-9-CM: V70.0     Anemia due to other cause, not classified     ICD-10-CM: D64.89  ICD-9-CM: 285.8           Anemia.  New problem.  Check Cbc, fe, ferritin.  Continue PNV.      Preventive Counseling:  Encouraged to stay active.  Tdap utd.    Dentist UTD.  Optho UTD.   Check FLP.    Orders Placed This Encounter   Procedures    Ferritin     Order Specific Question:   Release to patient     Answer:   Routine Release [[id number]]    Iron     Order Specific Question:   Release to patient     Answer:   Routine Release [[id number]]    Lipid Panel With / Chol / HDL Ratio     Order Specific Question:   Release to patient     Answer:   Routine Release [[id number]]    CBC & Differential     Order Specific Question:   Manual Differential     Answer:   " No     Order Specific Question:   Release to patient     Answer:   Routine Release [[id number]]       Current Outpatient Medications   Medication Sig Dispense Refill    Prenatal Vit-Fe Fumarate-FA (PRENATAL VITAMIN PO) Take  by mouth.       No current facility-administered medications for this visit.       Return in about 6 months (around 12/24/2024).    There are no Patient Instructions on file for this visit.

## 2024-06-25 LAB
BASOPHILS # BLD AUTO: 0 X10E3/UL (ref 0–0.2)
BASOPHILS NFR BLD AUTO: 1 %
CHOLEST SERPL-MCNC: 173 MG/DL (ref 100–199)
CHOLEST/HDLC SERPL: 3.1 RATIO (ref 0–4.4)
EOSINOPHIL # BLD AUTO: 0.2 X10E3/UL (ref 0–0.4)
EOSINOPHIL NFR BLD AUTO: 3 %
ERYTHROCYTE [DISTWIDTH] IN BLOOD BY AUTOMATED COUNT: 15.8 % (ref 11.7–15.4)
FERRITIN SERPL-MCNC: 69 NG/ML (ref 15–150)
HCT VFR BLD AUTO: 38.6 % (ref 34–46.6)
HDLC SERPL-MCNC: 55 MG/DL
HGB BLD-MCNC: 12.7 G/DL (ref 11.1–15.9)
IMM GRANULOCYTES # BLD AUTO: 0 X10E3/UL (ref 0–0.1)
IMM GRANULOCYTES NFR BLD AUTO: 0 %
IRON SERPL-MCNC: 65 UG/DL (ref 27–159)
LDLC SERPL CALC-MCNC: 102 MG/DL (ref 0–99)
LYMPHOCYTES # BLD AUTO: 2.5 X10E3/UL (ref 0.7–3.1)
LYMPHOCYTES NFR BLD AUTO: 42 %
MCH RBC QN AUTO: 27.9 PG (ref 26.6–33)
MCHC RBC AUTO-ENTMCNC: 32.9 G/DL (ref 31.5–35.7)
MCV RBC AUTO: 85 FL (ref 79–97)
MONOCYTES # BLD AUTO: 0.5 X10E3/UL (ref 0.1–0.9)
MONOCYTES NFR BLD AUTO: 8 %
NEUTROPHILS # BLD AUTO: 2.7 X10E3/UL (ref 1.4–7)
NEUTROPHILS NFR BLD AUTO: 46 %
PLATELET # BLD AUTO: 252 X10E3/UL (ref 150–450)
RBC # BLD AUTO: 4.56 X10E6/UL (ref 3.77–5.28)
TRIGL SERPL-MCNC: 84 MG/DL (ref 0–149)
VLDLC SERPL CALC-MCNC: 16 MG/DL (ref 5–40)
WBC # BLD AUTO: 5.9 X10E3/UL (ref 3.4–10.8)

## 2024-12-23 ENCOUNTER — OFFICE VISIT (OUTPATIENT)
Dept: FAMILY MEDICINE CLINIC | Facility: CLINIC | Age: 29
End: 2024-12-23
Payer: COMMERCIAL

## 2024-12-23 VITALS
WEIGHT: 146.2 LBS | BODY MASS INDEX: 23.5 KG/M2 | DIASTOLIC BLOOD PRESSURE: 80 MMHG | TEMPERATURE: 97.7 F | HEART RATE: 88 BPM | SYSTOLIC BLOOD PRESSURE: 118 MMHG | HEIGHT: 66 IN | RESPIRATION RATE: 18 BRPM | OXYGEN SATURATION: 100 %

## 2024-12-23 DIAGNOSIS — D64.89 ANEMIA DUE TO OTHER CAUSE, NOT CLASSIFIED: Primary | ICD-10-CM

## 2024-12-23 PROCEDURE — 99213 OFFICE O/P EST LOW 20 MIN: CPT | Performed by: FAMILY MEDICINE

## 2024-12-23 NOTE — PROGRESS NOTES
Chief Complaint   Patient presents with    Anemia       Subjective   Sultana SCHULZ is a 29 y.o. female.     Anemia  There has been no abdominal pain or confusion.      F/u anemia.  ON PNV only now.  NO period for 8 months now after delivery of baby.    The following portions of the patient's history were reviewed and updated as appropriate: allergies, current medications, past family history, past medical history, past social history, past surgical history and problem list.    Review of Systems   Constitutional:  Negative for appetite change and fatigue.   HENT:  Negative for nosebleeds and sore throat.    Eyes:  Negative for blurred vision and visual disturbance.   Respiratory:  Negative for shortness of breath and wheezing.    Cardiovascular:  Negative for chest pain and leg swelling.   Gastrointestinal:  Negative for abdominal distention and abdominal pain.   Endocrine: Negative for cold intolerance and polyuria.   Genitourinary:  Negative for dysuria and hematuria.   Musculoskeletal:  Negative for arthralgias and myalgias.   Skin:  Negative for color change and rash.   Neurological:  Negative for weakness and confusion.   Psychiatric/Behavioral:  Negative for agitation and depressed mood.        Patient Active Problem List   Diagnosis    BARBARA (generalized anxiety disorder)    Encounter for surveillance of contraceptive pills    Encounter for annual physical exam    Encounter for preconception consultation    Mixed obsessional thoughts and acts    Other viral warts    13 weeks gestation of pregnancy    Seasonal allergic rhinitis due to pollen    Other specified anemias       Allergies   Allergen Reactions    Ibuprofen Swelling    Amoxicillin Other (See Comments)         Current Outpatient Medications:     Prenatal Vit-Fe Fumarate-FA (PRENATAL VITAMIN PO), Take  by mouth., Disp: , Rfl:     Past Medical History:   Diagnosis Date    Anxiety        History reviewed. No pertinent surgical history.    Family History    Problem Relation Age of Onset    No Known Problems Mother     No Known Problems Father        Social History     Tobacco Use    Smoking status: Never    Smokeless tobacco: Never   Substance Use Topics    Alcohol use: No            Objective     Vitals:    12/23/24 1308   BP: 118/80   Pulse: 88   Resp: 18   Temp: 97.7 °F (36.5 °C)   SpO2: 100%     Body mass index is 23.61 kg/m².    Physical Exam  Vitals reviewed.   Constitutional:       Appearance: She is well-developed. She is not diaphoretic.   HENT:      Head: Normocephalic and atraumatic.   Eyes:      General: No scleral icterus.     Pupils: Pupils are equal, round, and reactive to light.   Neck:      Thyroid: No thyromegaly.   Cardiovascular:      Rate and Rhythm: Normal rate and regular rhythm.      Heart sounds: No murmur heard.     No friction rub. No gallop.   Pulmonary:      Effort: Pulmonary effort is normal. No respiratory distress.      Breath sounds: No wheezing or rales.   Chest:      Chest wall: No tenderness.   Abdominal:      General: Bowel sounds are normal. There is no distension.      Palpations: Abdomen is soft.      Tenderness: There is no abdominal tenderness.   Musculoskeletal:         General: No deformity. Normal range of motion.   Lymphadenopathy:      Cervical: No cervical adenopathy.   Skin:     General: Skin is warm and dry.      Findings: No rash.   Neurological:      Cranial Nerves: No cranial nerve deficit.      Motor: No abnormal muscle tone.         Lab Results   Component Value Date    GLUCOSE 69 03/08/2023    BUN 19 03/08/2023    CREATININE 0.63 03/08/2023    BCR 30.2 (H) 03/08/2023    K 4.0 03/08/2023    CO2 26.7 03/08/2023    CALCIUM 9.5 03/08/2023    PROTENTOTREF 7.4 03/08/2023    ALBUMIN 4.5 03/08/2023    LABIL2 1.6 03/08/2023    AST 16 03/08/2023    ALT 11 03/08/2023       WBC   Date Value Ref Range Status   06/24/2024 5.9 3.4 - 10.8 x10E3/uL Final   04/29/2024 11.87 (H) 4.5 - 11.0 10*3/uL Final     RBC   Date Value Ref  Range Status   06/24/2024 4.56 3.77 - 5.28 x10E6/uL Final   04/29/2024 3.42 (L) 4.0 - 5.2 10*6/uL Final     Hemoglobin   Date Value Ref Range Status   06/24/2024 12.7 11.1 - 15.9 g/dL Final   04/29/2024 9.8 (L) 12.0 - 16.0 g/dL Final     Hematocrit   Date Value Ref Range Status   06/24/2024 38.6 34.0 - 46.6 % Final   04/29/2024 30.1 (L) 36.0 - 46.0 % Final     MCV   Date Value Ref Range Status   06/24/2024 85 79 - 97 fL Final   04/29/2024 88 80.0 - 100.0 fL Final     MCH   Date Value Ref Range Status   06/24/2024 27.9 26.6 - 33.0 pg Final   04/29/2024 28.7 26.0 - 34.0 pg Final     MCHC   Date Value Ref Range Status   06/24/2024 32.9 31.5 - 35.7 g/dL Final   04/29/2024 32.6 31.0 - 37.0 g/dL Final     RDW   Date Value Ref Range Status   06/24/2024 15.8 (H) 11.7 - 15.4 % Final   04/29/2024 15.4 12.0 - 16.8 % Final     MPV   Date Value Ref Range Status   04/29/2024 10.1 8.4 - 12.4 fL Final     Platelets   Date Value Ref Range Status   06/24/2024 252 150 - 450 x10E3/uL Final   04/29/2024 199 140 - 440 10*3/uL Final     Neutrophil Rel %   Date Value Ref Range Status   06/24/2024 46 Not Estab. % Final   04/29/2024 60 45 - 80 % Final     Lymphocyte Rel %   Date Value Ref Range Status   06/24/2024 42 Not Estab. % Final   04/29/2024 28.4 15 - 50 % Final     Monocyte Rel %   Date Value Ref Range Status   06/24/2024 8 Not Estab. % Final   04/29/2024 10 0 - 15 % Final     Eosinophil Rel %   Date Value Ref Range Status   06/24/2024 3 Not Estab. % Final     Eosinophil %   Date Value Ref Range Status   04/29/2024 0.6 0 - 7 % Final     Basophil Rel %   Date Value Ref Range Status   06/24/2024 1 Not Estab. % Final   04/29/2024 0.3 0 - 2 % Final     Immature Grans %   Date Value Ref Range Status   04/29/2024 0.7 0.0 - 1.0 % Final     Neutrophils Absolute   Date Value Ref Range Status   06/24/2024 2.7 1.4 - 7.0 x10E3/uL Final   04/29/2024 7.12 2.0 - 8.8 10*3/uL Final     Lymphocytes Absolute   Date Value Ref Range Status   06/24/2024  "2.5 0.7 - 3.1 x10E3/uL Final   04/29/2024 3.37 0.7 - 5.5 10*3/uL Final     Monocytes Absolute   Date Value Ref Range Status   06/24/2024 0.5 0.1 - 0.9 x10E3/uL Final   04/29/2024 1.19 0.0 - 1.7 10*3/uL Final     Eosinophils Absolute   Date Value Ref Range Status   06/24/2024 0.2 0.0 - 0.4 x10E3/uL Final   04/29/2024 0.07 0.0 - 0.8 10*3/uL Final     Basophils Absolute   Date Value Ref Range Status   06/24/2024 0.0 0.0 - 0.2 x10E3/uL Final   04/29/2024 0.04 0.0 - 0.2 10*3/uL Final     Immature Grans, Absolute   Date Value Ref Range Status   04/29/2024 0.08 0.00 - 0.10 10*3/uL Final     nRBC   Date Value Ref Range Status   04/29/2024 0 0 /100(WBC) Final       Lab Results   Component Value Date    HGBA1C 4.9 10/02/2023       Lab Results   Component Value Date    IGBFYMNY82 382 03/08/2023       TSH   Date Value Ref Range Status   03/08/2023 1.120 0.270 - 4.200 uIU/mL Final       No results found for: \"CHOL\"  Lab Results   Component Value Date    TRIG 84 06/24/2024     Lab Results   Component Value Date    HDL 55 06/24/2024     Lab Results   Component Value Date     (H) 06/24/2024     Lab Results   Component Value Date    VLDL 16 06/24/2024     No results found for: \"LDLHDL\"      Procedures    Assessment & Plan   Problems Addressed this Visit       Other specified anemias - Primary     Diagnoses         Codes Comments    Anemia due to other cause, not classified    -  Primary ICD-10-CM: D64.89  ICD-9-CM: 285.8           Anemia.  Chronic.  Controlled.  Check CBC, fe, ferritin.  B12/tsh normal 3/23.  Continue PNV.    No orders of the defined types were placed in this encounter.      Current Outpatient Medications   Medication Sig Dispense Refill    Prenatal Vit-Fe Fumarate-FA (PRENATAL VITAMIN PO) Take  by mouth.       No current facility-administered medications for this visit.       Sultana SCHULZ had no medications administered during this visit.    Return in about 7 months (around 7/23/2025) for Annual " physical.    There are no Patient Instructions on file for this visit.

## 2024-12-24 LAB
BASOPHILS # BLD AUTO: 0 X10E3/UL (ref 0–0.2)
BASOPHILS NFR BLD AUTO: 1 %
EOSINOPHIL # BLD AUTO: 0.1 X10E3/UL (ref 0–0.4)
EOSINOPHIL NFR BLD AUTO: 2 %
ERYTHROCYTE [DISTWIDTH] IN BLOOD BY AUTOMATED COUNT: 12.8 % (ref 11.7–15.4)
FERRITIN SERPL-MCNC: 53 NG/ML (ref 15–150)
HCT VFR BLD AUTO: 40.5 % (ref 34–46.6)
HGB BLD-MCNC: 13.7 G/DL (ref 11.1–15.9)
IMM GRANULOCYTES # BLD AUTO: 0 X10E3/UL (ref 0–0.1)
IMM GRANULOCYTES NFR BLD AUTO: 0 %
IRON SERPL-MCNC: 59 UG/DL (ref 27–159)
LYMPHOCYTES # BLD AUTO: 2.1 X10E3/UL (ref 0.7–3.1)
LYMPHOCYTES NFR BLD AUTO: 34 %
MCH RBC QN AUTO: 30.2 PG (ref 26.6–33)
MCHC RBC AUTO-ENTMCNC: 33.8 G/DL (ref 31.5–35.7)
MCV RBC AUTO: 89 FL (ref 79–97)
MONOCYTES # BLD AUTO: 0.5 X10E3/UL (ref 0.1–0.9)
MONOCYTES NFR BLD AUTO: 8 %
NEUTROPHILS # BLD AUTO: 3.4 X10E3/UL (ref 1.4–7)
NEUTROPHILS NFR BLD AUTO: 55 %
PLATELET # BLD AUTO: 265 X10E3/UL (ref 150–450)
RBC # BLD AUTO: 4.54 X10E6/UL (ref 3.77–5.28)
WBC # BLD AUTO: 6.1 X10E3/UL (ref 3.4–10.8)